# Patient Record
Sex: FEMALE | Race: WHITE | NOT HISPANIC OR LATINO | Employment: OTHER | URBAN - METROPOLITAN AREA
[De-identification: names, ages, dates, MRNs, and addresses within clinical notes are randomized per-mention and may not be internally consistent; named-entity substitution may affect disease eponyms.]

---

## 2017-09-13 ENCOUNTER — GENERIC CONVERSION - ENCOUNTER (OUTPATIENT)
Dept: OTHER | Facility: OTHER | Age: 67
End: 2017-09-13

## 2017-10-05 RX ORDER — CHLORTHALIDONE 25 MG/1
25 TABLET ORAL DAILY
COMMUNITY

## 2017-10-05 RX ORDER — AMLODIPINE BESYLATE 10 MG/1
10 TABLET ORAL DAILY
COMMUNITY

## 2017-10-05 RX ORDER — POTASSIUM CHLORIDE 1.5 G/1.77G
20 POWDER, FOR SOLUTION ORAL 2 TIMES DAILY
COMMUNITY

## 2017-10-08 ENCOUNTER — ANESTHESIA EVENT (OUTPATIENT)
Dept: PERIOP | Facility: HOSPITAL | Age: 67
End: 2017-10-08
Payer: MEDICARE

## 2017-10-08 NOTE — ANESTHESIA PREPROCEDURE EVALUATION
Review of Systems/Medical History  Patient summary reviewed  Chart reviewed      Cardiovascular  Hypertension on > 1 medication,   Comment: Bigeminy ,  Pulmonary  Negative pulmonary ROS ,        GI/Hepatic    Bowel prep            Endo/Other  Negative endo/other ROS      GYN  Negative gynecology ROS          Hematology  Negative hematology ROS      Musculoskeletal  Negative musculoskeletal ROS Obesity  super morbid obesity,        Neurology  Negative neurology ROS      Psychology   Negative psychology ROS            Physical Exam    Airway    Mallampati score: II  TM Distance: >3 FB  Neck ROM: full     Dental   No notable dental hx     Cardiovascular  Rhythm: regular, Cardiovascular exam normal    Pulmonary  Pulmonary exam normal     Other Findings        Anesthesia Plan  ASA Score- 2       Anesthesia Type- IV sedation with anesthesia  Comment: Alex BERNABE, DO, the attending physician, have personally seen and evaluated the patient prior to anesthetic care  I have reviewed the pre-anesthetic record, and other medical records if appropriate to the anesthetic care  If a CRNA is involved in the case, I have reviewed the CRNA assessment, if present, and agree  The patient is in a suitable condition to proceed with my formulated anesthetic plan: MAC          Induction- intravenous      Informed Consent  Anesthetic plan and risks discussed with patient and spouse   No results found for: WBC, HGB, HCT, MCV, PLT  No results found for: GLUCOSE, CALCIUM, NA, K, CO2, CL, BUN, CREATININE  No results found for: INR, PROTIME  No results found for: PTT

## 2017-10-09 ENCOUNTER — ANESTHESIA (OUTPATIENT)
Dept: PERIOP | Facility: HOSPITAL | Age: 67
End: 2017-10-09
Payer: MEDICARE

## 2017-10-09 ENCOUNTER — HOSPITAL ENCOUNTER (OUTPATIENT)
Facility: HOSPITAL | Age: 67
Setting detail: OUTPATIENT SURGERY
Discharge: HOME | End: 2017-10-09
Attending: INTERNAL MEDICINE | Admitting: INTERNAL MEDICINE
Payer: MEDICARE

## 2017-10-09 ENCOUNTER — GENERIC CONVERSION - ENCOUNTER (OUTPATIENT)
Dept: OTHER | Facility: OTHER | Age: 67
End: 2017-10-09

## 2017-10-09 VITALS
HEIGHT: 61 IN | HEART RATE: 80 BPM | DIASTOLIC BLOOD PRESSURE: 61 MMHG | BODY MASS INDEX: 41.12 KG/M2 | TEMPERATURE: 97.7 F | OXYGEN SATURATION: 94 % | WEIGHT: 217.81 LBS | SYSTOLIC BLOOD PRESSURE: 114 MMHG | RESPIRATION RATE: 21 BRPM

## 2017-10-09 LAB
ANION GAP SERPL CALCULATED.3IONS-SCNC: 8 MMOL/L (ref 4–13)
BUN SERPL-MCNC: 12 MG/DL (ref 5–25)
CALCIUM SERPL-MCNC: 9.5 MG/DL (ref 8.3–10.1)
CHLORIDE SERPL-SCNC: 101 MMOL/L (ref 100–108)
CO2 SERPL-SCNC: 32 MMOL/L (ref 21–32)
CREAT SERPL-MCNC: 0.96 MG/DL (ref 0.6–1.3)
GFR SERPL CREATININE-BSD FRML MDRD: 62 ML/MIN/1.73SQ M
GLUCOSE P FAST SERPL-MCNC: 126 MG/DL (ref 65–99)
GLUCOSE SERPL-MCNC: 126 MG/DL (ref 65–140)
MAGNESIUM SERPL-MCNC: 1.7 MG/DL (ref 1.6–2.6)
POTASSIUM SERPL-SCNC: 3.2 MMOL/L (ref 3.5–5.3)
SODIUM SERPL-SCNC: 141 MMOL/L (ref 136–145)

## 2017-10-09 PROCEDURE — 83735 ASSAY OF MAGNESIUM: CPT | Performed by: NURSE ANESTHETIST, CERTIFIED REGISTERED

## 2017-10-09 PROCEDURE — 80048 BASIC METABOLIC PNL TOTAL CA: CPT | Performed by: NURSE ANESTHETIST, CERTIFIED REGISTERED

## 2017-10-09 RX ORDER — SODIUM CHLORIDE, SODIUM LACTATE, POTASSIUM CHLORIDE, CALCIUM CHLORIDE 600; 310; 30; 20 MG/100ML; MG/100ML; MG/100ML; MG/100ML
75 INJECTION, SOLUTION INTRAVENOUS CONTINUOUS
Status: DISCONTINUED | OUTPATIENT
Start: 2017-10-09 | End: 2017-10-09 | Stop reason: HOSPADM

## 2017-10-09 RX ORDER — PROPOFOL 10 MG/ML
INJECTION, EMULSION INTRAVENOUS AS NEEDED
Status: DISCONTINUED | OUTPATIENT
Start: 2017-10-09 | End: 2017-10-09 | Stop reason: SURG

## 2017-10-09 RX ORDER — POTASSIUM CHLORIDE 20 MEQ/1
40 TABLET, EXTENDED RELEASE ORAL ONCE
Status: COMPLETED | OUTPATIENT
Start: 2017-10-09 | End: 2017-10-09

## 2017-10-09 RX ORDER — IBUPROFEN 200 MG
200 TABLET ORAL EVERY 6 HOURS PRN
COMMUNITY

## 2017-10-09 RX ADMIN — PROPOFOL 100 MG: 10 INJECTION, EMULSION INTRAVENOUS at 08:36

## 2017-10-09 RX ADMIN — PROPOFOL 20 MG: 10 INJECTION, EMULSION INTRAVENOUS at 08:40

## 2017-10-09 RX ADMIN — POTASSIUM CHLORIDE 40 MEQ: 1500 TABLET, EXTENDED RELEASE ORAL at 09:24

## 2017-10-09 RX ADMIN — PROPOFOL 20 MG: 10 INJECTION, EMULSION INTRAVENOUS at 08:45

## 2017-10-09 RX ADMIN — SODIUM CHLORIDE, SODIUM LACTATE, POTASSIUM CHLORIDE, AND CALCIUM CHLORIDE: .6; .31; .03; .02 INJECTION, SOLUTION INTRAVENOUS at 08:27

## 2017-10-09 RX ADMIN — Medication 400 MG: at 09:23

## 2017-10-09 NOTE — ANESTHESIA POSTPROCEDURE EVALUATION
Post-Op Assessment Note      CV Status:  Stable    Mental Status:  Alert and awake    Hydration Status:  Stable    PONV Controlled:  None    Airway Patency:  Patent and adequate    Post Op Vitals Reviewed: Yes          Staff: Anesthesiologist, CRNA           BP   110/72   Temp      Pulse  92   Resp   18   SpO2   100%

## 2017-10-09 NOTE — OP NOTE
**** GI/ENDOSCOPY REPORT ****     PATIENT NAME: Kemal Garces ------ VISIT ID:  Patient ID:   [id number] YOB: 1950     INTRODUCTION: Colonoscopy - A 74 female patient presents for an outpatient   Colonoscopy at [facility]  PREVIOUS COLONOSCOPY: 2012     INDICATIONS: Surveillance  Screening for personal history of polyps  CONSENT:  The benefits, risks, and alternatives to the procedure were   discussed and informed consent was obtained from the patient  PREPARATION: EKG, pulse, pulse oximetry and blood pressure were monitored   throughout the procedure  The patient was identified by myself both   verbally and by visual inspection of ID band  Airway Assessment   Classification: Airway class 2 - Visualization of the soft palate, fauces   and uvula  ASA Classification: Class 2 - Patient has mild to moderate   systemic disturbance that may or may not be related to the disorder   requiring surgery  The patient was kept NPO for eight hours prior to the   procedure  The patient received Nulytely in preparation for the procedure  MEDICATIONS: Anesthesia-check records MAC anesthesia  PROCEDURE:  The endoscope was passed without difficulty through the anus   under direct visualization and advanced to the cecum, confirmed by   appendiceal orifice and ileocecal valve  The scope was withdrawn and the   mucosa was carefully examined  The quality of the preparation was good  Cecal Intubation Time: 5 minutes(s) Scope Withdrawal Time: 5 minutes(s)     RECTAL EXAM: Normal rectal exam      FINDINGS:  There was evidence of diverticulosis in the sigmoid colon  The   cecum, ascending colon, hepatic flexure, transverse colon, splenic   flexure, descending colon, rectosigmoid junction, and rectum appeared to   be normal      COMPLICATIONS: There were no complications  IMPRESSIONS: Diverticulosis found in the sigmoid colon   Normal cecum,   ascending colon, hepatic flexure, transverse colon, splenic flexure,   descending colon, rectosigmoid junction, and rectum  RECOMMENDATIONS: Colonoscopy recommended in 5 years  ESTIMATED BLOOD LOSS: None  PATHOLOGY SPECIMENS: Yes     PROCEDURE CODES: Colonoscopy     ICD-9 Codes: V12 72 Personal history of colonic polyps 562 10   Diverticulosis of colon (without mention of hemorrhage)     ICD-10 Codes: Z86 010 Personal history of colonic polyps K57 Diverticular   disease of intestine     PERFORMED BY: CLARISSA Morris  on 10/09/2017  Version 1, electronically signed by CLARISSA Atkinson  on   10/09/2017 at 08:54